# Patient Record
Sex: MALE | Race: BLACK OR AFRICAN AMERICAN
[De-identification: names, ages, dates, MRNs, and addresses within clinical notes are randomized per-mention and may not be internally consistent; named-entity substitution may affect disease eponyms.]

---

## 2015-09-14 VITALS — DIASTOLIC BLOOD PRESSURE: 68 MMHG | SYSTOLIC BLOOD PRESSURE: 163 MMHG

## 2018-02-26 ENCOUNTER — HOSPITAL ENCOUNTER (OUTPATIENT)
Dept: HOSPITAL 24 - RAD | Age: 68
End: 2018-02-26
Attending: SPECIALIST
Payer: COMMERCIAL

## 2018-02-26 DIAGNOSIS — M54.12: Primary | ICD-10-CM

## 2018-02-26 PROCEDURE — 72141 MRI NECK SPINE W/O DYE: CPT

## 2018-02-26 NOTE — MRI
STUDY: MRI OF THE CERVICAL SPINE



HISTORY:  Radiculopathy. Neck pain that radiates to right shoulder.



Comparison:  None.



Technique: An MRI of the cervical spine including sagittal T1, T2, and T2 STIR, axial T1, and T2 FSE 
images was performed using standard departmental protocol. 



Findings: 



Sagittal images: 



Visualized portions of the posterior fossa are within normal limits.The craniocervical junction is un
remarkable.Vertebral body heights and alignment are within normal limits.Marrow signal is age-appropr
iate.There is no evidence for fracture or significant bone marrow edema.  

There is multilevel degenerative disc disease, probably most notable at C5/6.

There is no significant prevertebral soft tissue swelling.  The surrounding paraspinal soft tissues a
re unremarkable.  



There is no evidence of cord compression. No intrinsic signal abnormalities are identified in the spi
nal cord itself.



Axial images:

C2 -- C3: There is a posterior disc osteophyte complex asymmetric to the left. The central canal and 
right neural foramina are adequate. There is mild left neural foraminal stenosis.



C3 -- C4: There is a posterior disc osteophyte complex and bilateral uncovertebral osteophyte formati
on. This results in mild central canal stenosis. The neural foramina are adequate.



C4 -- C5: There is a posterior disc osteophyte complex and bilateral uncovertebral osteophyte formati
on. This results in moderate central canal stenosis. There is moderate to severe bilateral neural for
aminal stenosis.



C5 -- C6: There is a posterior disc osteophyte complex and bilateral uncovertebral osteophyte formati
on. This results in mild central canal stenosis. There is moderate to severe bilateral neural foramin
al stenosis.



C6 -- C7: There is a posterior disc osteophyte complex and bilateral uncovertebral osteophyte formati
on. The combination of these findings results in mild central canal stenosis. There is moderate to se
yandel bilateral neural foraminal stenosis at this level.



C7 -- T1: Normal.



IMPRESSION:



1. Multilevel cervical spondylosis.



2. Moderate spinal stenosis at C3/4.



3. Multilevel neural foraminal stenosis. Please see above for detail.





Reported By:Electronically Signed by KELLIE NAVA MD at 2/26/2018 4:36:09 PM